# Patient Record
Sex: MALE | URBAN - NONMETROPOLITAN AREA
[De-identification: names, ages, dates, MRNs, and addresses within clinical notes are randomized per-mention and may not be internally consistent; named-entity substitution may affect disease eponyms.]

---

## 2022-04-15 ENCOUNTER — OFFICE VISIT (OUTPATIENT)
Dept: FAMILY MEDICINE CLINIC | Age: 22
End: 2022-04-15
Payer: COMMERCIAL

## 2022-04-15 VITALS
WEIGHT: 162.8 LBS | TEMPERATURE: 98.6 F | DIASTOLIC BLOOD PRESSURE: 66 MMHG | HEIGHT: 72 IN | OXYGEN SATURATION: 97 % | SYSTOLIC BLOOD PRESSURE: 110 MMHG | BODY MASS INDEX: 22.05 KG/M2 | HEART RATE: 76 BPM

## 2022-04-15 DIAGNOSIS — L03.211 FACIAL CELLULITIS: Primary | ICD-10-CM

## 2022-04-15 DIAGNOSIS — K02.9 DENTAL CARIES: ICD-10-CM

## 2022-04-15 PROCEDURE — 99213 OFFICE O/P EST LOW 20 MIN: CPT | Performed by: NURSE PRACTITIONER

## 2022-04-15 PROCEDURE — 96372 THER/PROPH/DIAG INJ SC/IM: CPT | Performed by: NURSE PRACTITIONER

## 2022-04-15 RX ORDER — AMOXICILLIN AND CLAVULANATE POTASSIUM 875; 125 MG/1; MG/1
1 TABLET, FILM COATED ORAL 2 TIMES DAILY
Qty: 20 TABLET | Refills: 0 | Status: SHIPPED | OUTPATIENT
Start: 2022-04-15 | End: 2022-04-25

## 2022-04-15 RX ORDER — TRAMADOL HYDROCHLORIDE 50 MG/1
50 TABLET ORAL EVERY 6 HOURS PRN
Qty: 9 TABLET | Refills: 0 | Status: SHIPPED | OUTPATIENT
Start: 2022-04-15 | End: 2022-04-18

## 2022-04-15 RX ORDER — CEFTRIAXONE 1 G/1
1000 INJECTION, POWDER, FOR SOLUTION INTRAMUSCULAR; INTRAVENOUS ONCE
Status: COMPLETED | OUTPATIENT
Start: 2022-04-15 | End: 2022-04-15

## 2022-04-15 RX ADMIN — CEFTRIAXONE 1000 MG: 1 INJECTION, POWDER, FOR SOLUTION INTRAMUSCULAR; INTRAVENOUS at 14:35

## 2022-04-15 NOTE — PROGRESS NOTES
Normal echo    Future Appointments  10/19/2021 8:00 AM    MD [facility] Subjective:  Chief Complaint   Patient presents with    Dental Pain     left side       Patient presents with dental pain for the past 3 days. The pain is located in the left upper jaw. Patient states pain is a 3-4 but up to 8/10 on the pain scale. The patient has been taking ibuprofen at home with minimal relief in their discomfort. Patient denies facial swelling or difficulty swallowing. Dental appointment scheduled for May 5th. No fever or chills. No nausea or vomiting. No ear pain, cough and runny nose or nasal congestion. ROS:  Positive and pertinent negatives as per HPI. All other systems are reviewed and negative. Current Outpatient Medications:     amoxicillin-clavulanate (AUGMENTIN) 875-125 MG per tablet, Take 1 tablet by mouth 2 times daily for 10 days, Disp: 20 tablet, Rfl: 0    traMADol (ULTRAM) 50 MG tablet, Take 1 tablet by mouth every 6 hours as needed for Pain for up to 3 days. Intended supply: 3 days. Take lowest dose possible to manage pain, Disp: 9 tablet, Rfl: 0   No Known Allergies     Objective:  Vitals:    04/15/22 1354   BP: 110/66   Pulse: 76   Temp: 98.6 °F (37 °C)   TempSrc: Temporal   SpO2: 97%   Weight: 162 lb 12.8 oz (73.8 kg)   Height: 6' (1.829 m)         Exam:  Const: Appears healthy and well developed. Vital reviewed as per triage. No acute distress. Head/Face: Normocephalic, atraumatic. Facies is symmetric. Eyes: Pupils equal, round and reactive to light. ENMT: Tympanic membranes are pearly gray with good light reflex bilaterally. Nares are patent. Buccal mucosa is moist.  The patient has no erythema in the posterior pharynx. The patient has tenderness over the left upper jaw, there is also tenderness to the buccal mucosa and the external cheek. No abscess, trismus. Minimal facial swelling. . No gingival erythema or swelling. Resp: No respiratory distress. Lymph: No visible or palpable cervical lymphadenopathy. Submandibular nodes not palpable.   Skin: Skin is warm and dry. Neuro: Alert and oriented x3. Speech is articulate and fluent. Psych: Mood and affect are appropriate to situation. Discussed with patient the use of probiotics to assist with adverse GI effects including C-diff. Follow up with dentist ASAP. Kitty Renee was seen today for dental pain. Diagnoses and all orders for this visit:    Facial cellulitis  -     amoxicillin-clavulanate (AUGMENTIN) 875-125 MG per tablet; Take 1 tablet by mouth 2 times daily for 10 days  -     traMADol (ULTRAM) 50 MG tablet; Take 1 tablet by mouth every 6 hours as needed for Pain for up to 3 days. Intended supply: 3 days. Take lowest dose possible to manage pain    Dental caries  -     cefTRIAXone (ROCEPHIN) injection 1,000 mg  -     amoxicillin-clavulanate (AUGMENTIN) 875-125 MG per tablet; Take 1 tablet by mouth 2 times daily for 10 days  -     traMADol (ULTRAM) 50 MG tablet; Take 1 tablet by mouth every 6 hours as needed for Pain for up to 3 days. Intended supply: 3 days. Take lowest dose possible to manage pain      I am concerned regarding the possibility of this progressing. Patient's mother actually had a similar occurrence and required surgical evaluation and IV antibiotics. They do wish to try outpatient therapy first despite these risks. Rocephin injection will be given as this is available today, patient will be started on Augmentin. He is in significant discomfort, he is taking ibuprofen 800 mg and I will give a very small prescription for tramadol to utilize over the weekend. ER warnings reviewed in detail.     Seen By:    DUTCH Oconnell - CNP